# Patient Record
Sex: FEMALE | Race: WHITE | Employment: OTHER | URBAN - NONMETROPOLITAN AREA
[De-identification: names, ages, dates, MRNs, and addresses within clinical notes are randomized per-mention and may not be internally consistent; named-entity substitution may affect disease eponyms.]

---

## 2022-11-04 ENCOUNTER — TELEPHONE (OUTPATIENT)
Dept: SURGERY | Age: 77
End: 2022-11-04

## 2022-11-04 RX ORDER — OMEGA-3S/DHA/EPA/FISH OIL/D3 300MG-1000
400 CAPSULE ORAL DAILY
COMMUNITY

## 2022-11-04 RX ORDER — MELOXICAM 15 MG/1
15 TABLET ORAL DAILY
COMMUNITY

## 2022-11-04 RX ORDER — ACETAMINOPHEN 500 MG
500 TABLET ORAL EVERY 6 HOURS PRN
COMMUNITY

## 2022-11-04 NOTE — TELEPHONE ENCOUNTER
[facility] Colonoscopy Worksheet    Patient Name: Sabiha Sharma   : 1945  Primary Care Physician: Serge Presley Date: 2022    [place]gery Location:   [][place] [] [place] [x] [facility] [] [facility]    Why has a Colonoscopy been recommended for you? Due     To properly code your procedure we must ask the following questions. PLEASE CHECK ALL THAT APPLY. Are you currently having any of the following symptoms? No     [] Rectal Bleeding (K62.5) [] Bloody Stool (K92.1) [] Dark Tarry Stool (K92.1)  [] Fecal Occult Positive Blood (confirmed by blood test R19.5)  [] Anemia (low hemoglobin D64.9) [] Abdominal Pain (R10.84) [] Diarrhea (R19.7)    **If you have any of these symptoms your colonoscopy is diagnostic and must be coded as such. It will not be coded as a screening colonoscopy. If you have no symptoms but have a personal history of polyps or a family history of colon cancer you fall in the high risk-screening category and we need to know more information. If you have had a polyp or polyps removed at your last colonoscopy then the first scope after that is considered diagnostic. Also if you have irritable bowel syndrome Chron's disease, diverticulitis or colon cancer then the scope would be a diagnostic colonoscopy. Have you ever had a colonoscopy? [] Yes  [x] No    If so, where and when was that done? N/A    Was anything found during the last scope? N/A    Was it removed? N/A    Do you have a family history of colon cancer? No       Have you personally been diagnosed with colon cancer? No     Any tobacco use? [] Yes    [x] No    Any alcohol use? [] Yes    [x] No  If yes, how often? In the last six (6) months have you experienced any of the following symptoms?  No    [] Blood from the rectum or stool  [] Abdominal Pain   [] Diarrhea  [] Itching of rectum   [] Vomiting  [] Constipation   [] Black stools  [] Bloating   [] Mucous in your stool  [] [place]   [] Change in bowel habits    Do you have allergies? [] Yes  If yes, please list:   [x] No    Do you take Warfarin, Coumadin, Plavix, Eliquis, Xarelto, or aspirin OR do you take a medication that thins your blood? [x] Yes  [] No    Please list all of your medications including over-the-counter and herbal supplements  Acetaminophen  Vitamin D    Meloxicam                       List your past surgical procedures                               Medical History  Do you have any history of:  [x] None [] Heart Disease [] Hypertension  [] Diabetes [] Seizures [] Respiratory/Asthma  [] Sleep Apnea [] G.E.R.D [] Blood Disorder  [] Vascular Disease [] Depression    List the medical problems you are being treated for    Osteoarthritis                             History of MRSA? No         Have you check with your insurance company to see what you BENEFITS are id you have had a colonoscopy? If you have not check with them we encourage you to find this information out before the procedure. Below are codes you may need to five them.        CPT and Diagnosis: FOR OFFICE USE ONLY  16569 Diagnostic colonoscopy- All patients Symptoms (check above or list):   79634 Screening colonoscopy for NON-MEDICARE patients Diagnosis code: Z12.11   Screening colonoscopy for MEDICARE patients Diagnosis code: Z12.11   Screening colonoscopy for MEDICARE- HIGH RISK PATIENTS   Z85.038- Personal history malignant neoplasm, large intestine   Z85.048- Personal history malignant neoplasm, rectum/anus   Z86.010- Personal history colon polyps   Z80.0- Family history malignant neoplasm   Z83.79- Family history digestive disorder    Reviewed by nurse: Peggy Mcfarlane       SURGERY SCHEDULED FOR 11/28/2022        ADDITIONAL NOTES:

## 2024-04-18 ENCOUNTER — APPOINTMENT (OUTPATIENT)
Dept: GENERAL RADIOLOGY | Age: 79
End: 2024-04-18
Payer: MEDICARE

## 2024-04-18 ENCOUNTER — HOSPITAL ENCOUNTER (EMERGENCY)
Age: 79
Discharge: HOME | End: 2024-04-18
Attending: EMERGENCY MEDICINE
Payer: MEDICARE

## 2024-04-18 VITALS
HEART RATE: 57 BPM | OXYGEN SATURATION: 100 % | SYSTOLIC BLOOD PRESSURE: 151 MMHG | HEIGHT: 62 IN | DIASTOLIC BLOOD PRESSURE: 71 MMHG | WEIGHT: 260 LBS | RESPIRATION RATE: 16 BRPM | BODY MASS INDEX: 47.84 KG/M2 | TEMPERATURE: 98.2 F

## 2024-04-18 DIAGNOSIS — S86.912A STRAIN OF LEFT KNEE, INITIAL ENCOUNTER: Primary | ICD-10-CM

## 2024-04-18 PROCEDURE — 73562 X-RAY EXAM OF KNEE 3: CPT

## 2024-04-18 PROCEDURE — 99283 EMERGENCY DEPT VISIT LOW MDM: CPT

## 2024-04-18 RX ORDER — LIDOCAINE 50 MG/G
1 PATCH TOPICAL DAILY
Qty: 10 PATCH | Refills: 0 | Status: SHIPPED | OUTPATIENT
Start: 2024-04-18 | End: 2024-04-28

## 2024-04-18 ASSESSMENT — PAIN SCALES - GENERAL
PAINLEVEL_OUTOF10: 5
PAINLEVEL_OUTOF10: 2

## 2024-04-18 ASSESSMENT — PAIN DESCRIPTION - ONSET: ONSET: ON-GOING

## 2024-04-18 ASSESSMENT — PAIN DESCRIPTION - ORIENTATION: ORIENTATION: LEFT

## 2024-04-18 ASSESSMENT — PAIN DESCRIPTION - DESCRIPTORS: DESCRIPTORS: SHARP

## 2024-04-18 ASSESSMENT — PAIN DESCRIPTION - PAIN TYPE: TYPE: ACUTE PAIN

## 2024-04-18 ASSESSMENT — LIFESTYLE VARIABLES
HOW MANY STANDARD DRINKS CONTAINING ALCOHOL DO YOU HAVE ON A TYPICAL DAY: PATIENT DOES NOT DRINK
HOW OFTEN DO YOU HAVE A DRINK CONTAINING ALCOHOL: NEVER

## 2024-04-18 ASSESSMENT — PAIN DESCRIPTION - LOCATION: LOCATION: KNEE

## 2024-04-18 ASSESSMENT — PAIN - FUNCTIONAL ASSESSMENT
PAIN_FUNCTIONAL_ASSESSMENT: PREVENTS OR INTERFERES WITH MANY ACTIVE NOT PASSIVE ACTIVITIES
PAIN_FUNCTIONAL_ASSESSMENT: 0-10

## 2024-04-18 ASSESSMENT — PAIN DESCRIPTION - FREQUENCY: FREQUENCY: CONTINUOUS

## 2024-04-18 NOTE — ED PROVIDER NOTES
For 7 days, Disp-100 g, R-0, Normal             Follow-up:  Your physician              Final Impression:   1. Strain of left knee, initial encounter               (Please note that portions of this note were completed with a voice recognitionprogram.  Efforts were made to edit the dictations but occasionally words are mis-transcribed.)    Pierre Calloway MD (electronically signed)  Attending Emergency Physician            Pierre Calloway MD  04/18/24 [id number]

## 2024-10-16 LAB
ALBUMIN/GLOBULIN RATIO: 1.3 G/DL
ALBUMIN: 3.7 G/DL (ref 3.5–5)
ALP BLD-CCNC: 66 UNITS/L (ref 38–126)
ALT SERPL-CCNC: 54 UNITS/L (ref 4–35)
ANION GAP SERPL CALCULATED.3IONS-SCNC: 14 MMOL/L (ref 3–11)
AST SERPL-CCNC: 95 UNITS/L (ref 14–36)
BILIRUB SERPL-MCNC: 1 MG/DL (ref 0.2–1.3)
BUN BLDV-MCNC: 22 MG/DL (ref 7–17)
CALCIUM SERPL-MCNC: 8.6 MG/DL (ref 8.4–10.2)
CHLORIDE BLD-SCNC: 111 MMOL/L (ref 98–120)
CO2: 18 MMOL/L (ref 22–31)
CREAT SERPL-MCNC: 1.2 MG/DL (ref 0.5–1)
CREATININE CLEARANCE: 31.05
GFR, ESTIMATED: 46.3
GLOBULIN: 2.8 G/DL
GLUCOSE: 147 MG/DL (ref 65–105)
POTASSIUM SERPL-SCNC: 4 MMOL/L (ref 3.6–5)
SODIUM BLD-SCNC: 139 MMOL/L (ref 135–145)
TOTAL PROTEIN: 6.5 G/DL (ref 6.3–8.2)

## 2024-10-21 ENCOUNTER — TELEPHONE (OUTPATIENT)
Dept: SURGERY | Age: 79
End: 2024-10-21

## 2024-10-21 NOTE — TELEPHONE ENCOUNTER
Spoke with Dr Amaya and he states that it is okay to have her tooth pulled. Called patient and explained Dr Amaya's recommendations. Patient verbalized understanding.

## 2024-10-21 NOTE — TELEPHONE ENCOUNTER
Patient had a Lap Cholecystectomy done on 10-15-24 by Dr Amaya @ [facility]. She was wondering if it is okay for the dentist to pull a tooth.    Please advise

## 2024-10-21 NOTE — TELEPHONE ENCOUNTER
Patient called the office wanting to know if she can have a dental procedure done. She is 2wk post op from a cholectomy. Please advise and call her back. [phone]

## 2024-10-28 ENCOUNTER — OFFICE VISIT (OUTPATIENT)
Dept: SURGERY | Age: 79
End: 2024-10-28
Payer: MEDICARE

## 2024-10-28 VITALS
WEIGHT: 247 LBS | DIASTOLIC BLOOD PRESSURE: 60 MMHG | HEART RATE: 58 BPM | BODY MASS INDEX: 45.18 KG/M2 | SYSTOLIC BLOOD PRESSURE: 100 MMHG | TEMPERATURE: 98.9 F

## 2024-10-28 DIAGNOSIS — Z09 POSTOP CHECK: Primary | ICD-10-CM

## 2024-10-28 PROCEDURE — 99212 OFFICE O/P EST SF 10 MIN: CPT | Performed by: SURGERY

## 2024-10-28 PROCEDURE — 99024 POSTOP FOLLOW-UP VISIT: CPT | Performed by: SURGERY

## 2024-10-28 RX ORDER — CALCIUM CARBONATE 500(1250)
500 TABLET ORAL DAILY
COMMUNITY

## 2024-10-28 NOTE — PROGRESS NOTES
Patient is here post for robotic cholecystectomy at [facility].  She is 2 weeks out.  She is doing fairly well.  She had a significant inflamed gallbladder.  She has had a little bit of gas but no pain no nausea no vomiting she is eating okay but not a lot of food.  She is not real hungry.  But overall she is doing fairly well.  She feels a lot better than she did preoperatively.  /60   Pulse 58   Temp 98.9 °F (37.2 °C) (Tympanic)   Wt 112 kg (247 lb)   BMI 45.18 kg/m²   On abdominal exam she has a little bit of ecchymosis around the incisions the staple lines are healing nicely.  Her abdominal exam is completely benign.  We will remove the staples we will continue to see the patient on a as needed basis.  If she has further symptoms we will be glad to see her.